# Patient Record
Sex: FEMALE | URBAN - METROPOLITAN AREA
[De-identification: names, ages, dates, MRNs, and addresses within clinical notes are randomized per-mention and may not be internally consistent; named-entity substitution may affect disease eponyms.]

---

## 2020-04-22 ENCOUNTER — APPOINTMENT (RX ONLY)
Dept: URBAN - METROPOLITAN AREA CLINIC 117 | Facility: CLINIC | Age: 25
Setting detail: DERMATOLOGY
End: 2020-04-22

## 2020-04-22 DIAGNOSIS — L23.9 ALLERGIC CONTACT DERMATITIS, UNSPECIFIED CAUSE: ICD-10-CM | Status: RESOLVING

## 2020-04-22 PROCEDURE — ? DIAGNOSIS COMMENT

## 2020-04-22 PROCEDURE — 99202 OFFICE O/P NEW SF 15 MIN: CPT | Mod: 95

## 2020-04-22 PROCEDURE — ? TREATMENT REGIMEN

## 2020-04-22 PROCEDURE — ? PRESCRIPTION

## 2020-04-22 PROCEDURE — ? COUNSELING

## 2020-04-22 RX ORDER — BETAMETHASONE DIPROPIONATE 0.5 MG/G
CREAM, AUGMENTED TOPICAL
Qty: 1 | Refills: 0 | Status: ERX | COMMUNITY
Start: 2020-04-22

## 2020-04-22 RX ADMIN — BETAMETHASONE DIPROPIONATE: 0.5 CREAM, AUGMENTED TOPICAL at 00:00

## 2020-04-22 ASSESSMENT — LOCATION SIMPLE DESCRIPTION DERM
LOCATION SIMPLE: RIGHT FOREARM
LOCATION SIMPLE: LEFT FOREARM
LOCATION SIMPLE: ABDOMEN

## 2020-04-22 ASSESSMENT — LOCATION DETAILED DESCRIPTION DERM
LOCATION DETAILED: LEFT PROXIMAL DORSAL FOREARM
LOCATION DETAILED: EPIGASTRIC SKIN
LOCATION DETAILED: RIGHT PROXIMAL DORSAL FOREARM

## 2020-04-22 ASSESSMENT — LOCATION ZONE DERM
LOCATION ZONE: ARM
LOCATION ZONE: TRUNK

## 2020-04-22 NOTE — PROCEDURE: TREATMENT REGIMEN
Plan: REVIEW DX, PX, LIKELY ETIOL\\nSHOWER ASAP AFTER FUTURE EXPOSURE AND LIMIT RISKS OF EXPOSURE WHEN POSS.\\nDISC RISKS OF PIE/PIH.\\nSPF.
Otc Regimen: Gentle cleansers\\nEmollients
Continue Regimen: Finish on hand Prednisone 20mg bid (has) to complete 2 7-day courses\\nHydroxyzine 25mg one po q 4-6 hours prn itch (has)\\nDCN (complete course)
Detail Level: Detailed
Discontinue Regimen: Mupirocin ointment
Initiate Treatment: Diprolene AF cream bid x 2 weeks\\nAvoid FGA

## 2020-04-22 NOTE — HPI: RASH
What Type Of Note Output Would You Prefer (Optional)?: Bullet Format
Is This A New Presentation, Or A Follow-Up?: Rash
Additional History: Started after working in yard with . Went to [facility] x 2.  Rxd Prednisone 20mg bid daily x approx 13 days and hydroxyzine 25mg q 4-6 hours prn itch at first visit Went back to [facility] 04/16/2020 and rxd DCN 100mg bid x 10 days and Mupirocin oint.  States rash is much better.   Photos in attachments